# Patient Record
Sex: FEMALE | Race: WHITE | NOT HISPANIC OR LATINO | URBAN - METROPOLITAN AREA
[De-identification: names, ages, dates, MRNs, and addresses within clinical notes are randomized per-mention and may not be internally consistent; named-entity substitution may affect disease eponyms.]

---

## 2017-09-08 ENCOUNTER — APPOINTMENT (RX ONLY)
Dept: URBAN - METROPOLITAN AREA SURGERY 2 | Facility: SURGERY | Age: 47
Setting detail: DERMATOLOGY
End: 2017-09-08

## 2017-09-08 DIAGNOSIS — Z41.1 ENCOUNTER FOR COSMETIC SURGERY: ICD-10-CM

## 2017-09-08 DIAGNOSIS — L98.8 OTHER SPECIFIED DISORDERS OF THE SKIN AND SUBCUTANEOUS TISSUE: ICD-10-CM

## 2017-09-08 DIAGNOSIS — L90.5 SCAR CONDITIONS AND FIBROSIS OF SKIN: ICD-10-CM

## 2017-09-08 PROBLEM — F41.9 ANXIETY DISORDER, UNSPECIFIED: Status: ACTIVE | Noted: 2017-09-08

## 2017-09-08 PROCEDURE — ? PATIENT SPECIFIC COUNSELING

## 2017-09-08 PROCEDURE — ? COUNSELING - SCAR

## 2017-09-08 PROCEDURE — 99203 OFFICE O/P NEW LOW 30 MIN: CPT

## 2017-09-08 PROCEDURE — ?

## 2017-09-08 ASSESSMENT — LOCATION DETAILED DESCRIPTION DERM: LOCATION DETAILED: LEFT PERINEUM

## 2017-09-08 ASSESSMENT — LOCATION ZONE DERM: LOCATION ZONE: PERINEUM

## 2017-09-08 ASSESSMENT — LOCATION SIMPLE DESCRIPTION DERM: LOCATION SIMPLE: LEFT PERINEUM

## 2017-09-08 NOTE — PROCEDURE: PATIENT SPECIFIC COUNSELING
Other (Free Text): I explained to the patient and her  that what I see looks to be a sinus tract or fistula\\nThe scaring feels soft, no evidence of hypertrophic scaring.  \\nNeed to examine whether or not if it's scar tissue. \\nThe pin hole I see makes me suspicious that there is a tract that may be going into her anus. I would assume that the tissue on the inside is also damaged. \\nI explained that she could possibly have a fistula because of her amount of pain which I explained will never close if not treated. \\nI explained it could be a foreign body recation. However the fact that she had  a CT scan and nothing was found supporting that will rules out a foreign body reaction. \\nI advised pt to follow up with a colorectal surgeon to rule out a fistula. \\nI explained that if it is not a fistula I can go in take out the sinus track and any scar tissue and close her up with a local flap.
Detail Level: Zone
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
## 2017-09-15 ENCOUNTER — APPOINTMENT (RX ONLY)
Dept: URBAN - METROPOLITAN AREA SURGERY 2 | Facility: SURGERY | Age: 47
Setting detail: DERMATOLOGY
End: 2017-09-15

## 2017-09-15 DIAGNOSIS — L98.8 OTHER SPECIFIED DISORDERS OF THE SKIN AND SUBCUTANEOUS TISSUE: ICD-10-CM

## 2017-09-15 DIAGNOSIS — Z41.1 ENCOUNTER FOR COSMETIC SURGERY: ICD-10-CM

## 2017-09-15 DIAGNOSIS — L90.5 SCAR CONDITIONS AND FIBROSIS OF SKIN: ICD-10-CM

## 2017-09-15 PROCEDURE — ? PRE-OP WORKLIST

## 2017-09-15 PROCEDURE — ? PATIENT SPECIFIC COUNSELING

## 2017-09-15 PROCEDURE — ? COUNSELING - SCAR

## 2017-09-15 PROCEDURE — 99213 OFFICE O/P EST LOW 20 MIN: CPT

## 2017-09-15 PROCEDURE — ?

## 2017-09-15 ASSESSMENT — LOCATION ZONE DERM: LOCATION ZONE: PERINEUM

## 2017-09-15 ASSESSMENT — LOCATION SIMPLE DESCRIPTION DERM: LOCATION SIMPLE: LEFT PERINEUM

## 2017-09-15 ASSESSMENT — LOCATION DETAILED DESCRIPTION DERM: LOCATION DETAILED: LEFT PERINEUM

## 2017-09-15 NOTE — PROCEDURE: PRE-OP WORKLIST
Photos Taken?: photos to be taken on day of procedure
Surgeon: Emeterio Wallace MD
Detail Level: Simple

## 2017-09-15 NOTE — PROCEDURE: PATIENT SPECIFIC COUNSELING
Other (Free Text): Patient was informed that Dr. Wallace spoke with colorectal specialist and it was confirmed that she does not have a fistula. Patient was Informed  that she is now a candidate for surgery. Patient was post op instructions and signed consents and prescriptions where given.
Detail Level: Zone

## 2017-09-27 ENCOUNTER — RX ONLY (OUTPATIENT)
Age: 47
Setting detail: RX ONLY
End: 2017-09-27

## 2017-09-27 RX ORDER — OXYCODONE HYDROCHLORIDE AND ACETAMINOPHEN 5; 325 MG/1; MG/1
TABLET ORAL
Qty: 20 | Refills: 0 | Status: CANCELLED
Stop reason: ENTERED-IN-ERROR

## 2017-09-27 RX ORDER — ALPRAZOLAM 1 MG/1
TABLET ORAL
Qty: 8 | Refills: 0 | Status: ACTIVE

## 2017-09-27 RX ORDER — METHOCARBAMOL 500 MG/1
TABLET, FILM COATED ORAL
Qty: 40 | Refills: 0 | Status: ERX | COMMUNITY
Start: 2017-09-27

## 2017-09-27 RX ORDER — PROMETHAZINE HYDROCHLORIDE 25 MG/1
TABLET ORAL
Qty: 8 | Refills: 0 | Status: ERX | COMMUNITY
Start: 2017-09-27

## 2017-09-27 RX ORDER — DOXYCYCLINE HYCLATE 100 MG/1
CAPSULE, GELATIN COATED ORAL
Qty: 7 | Refills: 0 | Status: ERX | COMMUNITY
Start: 2017-09-27

## 2017-10-04 ENCOUNTER — APPOINTMENT (RX ONLY)
Dept: URBAN - METROPOLITAN AREA SURGERY 2 | Facility: SURGERY | Age: 47
Setting detail: DERMATOLOGY
End: 2017-10-04

## 2017-10-04 DIAGNOSIS — Z48.89 ENCOUNTER FOR OTHER SPECIFIED SURGICAL AFTERCARE: ICD-10-CM

## 2017-10-04 PROCEDURE — ? POST-OP CHECK

## 2017-10-04 PROCEDURE — 99024 POSTOP FOLLOW-UP VISIT: CPT

## 2017-10-04 PROCEDURE — ? PATIENT SPECIFIC COUNSELING

## 2017-10-04 PROCEDURE — ? COUNSELING - POST-OP CHECK

## 2017-10-04 ASSESSMENT — LOCATION ZONE DERM: LOCATION ZONE: PERINEUM

## 2017-10-04 ASSESSMENT — LOCATION DETAILED DESCRIPTION DERM: LOCATION DETAILED: LEFT PERINEUM

## 2017-10-04 ASSESSMENT — LOCATION SIMPLE DESCRIPTION DERM: LOCATION SIMPLE: LEFT PERINEUM

## 2017-10-04 NOTE — PROCEDURE: PATIENT SPECIFIC COUNSELING
Other (Free Text): Discussed with patient that the procedure went well and that after everything was removed it looked like very healthy skin left. \\n\\nDiscussed that patient needs to keep knee in front of her, do her best to not rotate left leg outward.\\nAdvised to advance with right foot, and bring left foot to the right foot in order to keep from extending left leg. \\nLeft hip should be up when lying down. \\n\\nAdvised patient to continue wearing the flex net underwear that were given by the hospital until drainage is gone.\\n\\nPatient advised that if next Tuesday she is doing well and it's clean and feeling okay and she sends a picture, then we may be able to skip the visit next week and return in 2 weeks.
Detail Level: Simple

## 2017-10-07 ENCOUNTER — RX ONLY (OUTPATIENT)
Age: 47
Setting detail: RX ONLY
End: 2017-10-07

## 2017-10-07 RX ORDER — DOXYCYCLINE HYCLATE 100 MG/1
CAPSULE, GELATIN COATED ORAL
Qty: 14 | Refills: 0 | Status: ERX

## 2017-10-16 ENCOUNTER — APPOINTMENT (RX ONLY)
Dept: URBAN - METROPOLITAN AREA CLINIC 12 | Facility: CLINIC | Age: 47
Setting detail: DERMATOLOGY
End: 2017-10-16

## 2017-10-16 ENCOUNTER — APPOINTMENT (RX ONLY)
Dept: URBAN - METROPOLITAN AREA SURGERY 2 | Facility: SURGERY | Age: 47
Setting detail: DERMATOLOGY
End: 2017-10-16

## 2017-10-16 DIAGNOSIS — Z48.89 ENCOUNTER FOR OTHER SPECIFIED SURGICAL AFTERCARE: ICD-10-CM

## 2017-10-16 PROCEDURE — ? POST-OP CHECK

## 2017-10-16 PROCEDURE — ? COUNSELING - POST-OP CHECK

## 2017-10-16 PROCEDURE — ? SET GLOBAL PERIOD

## 2017-10-16 PROCEDURE — ? PATIENT SPECIFIC COUNSELING

## 2017-10-16 PROCEDURE — 99024 POSTOP FOLLOW-UP VISIT: CPT

## 2017-10-16 ASSESSMENT — LOCATION ZONE DERM
LOCATION ZONE: PERINEUM
LOCATION ZONE: PERINEUM

## 2017-10-16 ASSESSMENT — LOCATION SIMPLE DESCRIPTION DERM
LOCATION SIMPLE: LEFT PERINEUM
LOCATION SIMPLE: LEFT PERINEUM

## 2017-10-16 ASSESSMENT — LOCATION DETAILED DESCRIPTION DERM
LOCATION DETAILED: LEFT PERINEUM
LOCATION DETAILED: LEFT PERINEUM

## 2017-10-16 NOTE — PROCEDURE: PATIENT SPECIFIC COUNSELING
Other (Free Text): Discussed with patient that she is healing well, and that the swelling she is experiencing is normal. There are no puss pockets present or any evidence of fluid building up. I advised patient that she should be in the clear if she continues improving with no draining or buildup within the next 2 months.\\n\\nI advised patient that she should continue applying topical ointment and washing with hibiclens. I also advised patient to take Ibuprofen 600 mg 3 x day for anti-inflammatory reasons.\\n\\nFollow up in 10 days
Detail Level: Zone

## 2017-10-16 NOTE — PROCEDURE: PATIENT SPECIFIC COUNSELING
Other (Free Text): Discussed with patient that she is healing well, and that the swelling she is experiencing is normal. There are no puss pockets present or any evidence of fluid building up. I advised patient that she should be in the clear if she continues improving with no draining or buildup within the next 2 months. \\n\\nI advised patient that she should continue applying topical ointment and washing with hibiclens. I also advised patient to take Ibuprofen 600 mg 3 x day for anti-inflammatory reasons. \\n\\nFollow up in 10 days
Detail Level: Simple

## 2017-10-16 NOTE — HPI: POST-OP CHECK, WOUND RECONSTRUCTION
History: This is a 47 year old female who is following up for post-op check on the left perineum. She was seen on October 4, 2017, at which time counseling post-op check was performed, she was treated with Post-Op Check, and patient specific counseling was performed.\\n\\nThe patient is now here for postop check and patient mentions soreness.\\n\\nToday the patient reports:\\nModifying factors: improved with treatment. Quality: constant.

## 2017-10-27 ENCOUNTER — APPOINTMENT (RX ONLY)
Dept: URBAN - METROPOLITAN AREA CLINIC 12 | Facility: CLINIC | Age: 47
Setting detail: DERMATOLOGY
End: 2017-10-27

## 2017-10-27 DIAGNOSIS — Z48.89 ENCOUNTER FOR OTHER SPECIFIED SURGICAL AFTERCARE: ICD-10-CM

## 2017-10-27 PROCEDURE — ? POST-OP CHECK

## 2017-10-27 PROCEDURE — ? PATIENT SPECIFIC COUNSELING

## 2017-10-27 PROCEDURE — 99024 POSTOP FOLLOW-UP VISIT: CPT

## 2017-10-27 PROCEDURE — ? SET GLOBAL PERIOD

## 2017-10-27 ASSESSMENT — LOCATION SIMPLE DESCRIPTION DERM: LOCATION SIMPLE: LEFT PERINEUM

## 2017-10-27 ASSESSMENT — LOCATION DETAILED DESCRIPTION DERM: LOCATION DETAILED: LEFT PERINEUM

## 2017-10-27 ASSESSMENT — LOCATION ZONE DERM: LOCATION ZONE: PERINEUM

## 2017-10-27 NOTE — PROCEDURE: PATIENT SPECIFIC COUNSELING
Detail Level: Zone
Other (Free Text): Advised that the wound is healing very well and swelling is down a lot. I am very pleased with the progress I can see. I advised patient she can gradually can start getting back into routine, even taking baths. The gym is not advised yet and she is not to over due walking.\\n\\nRecommended patient get a donut for her bottom to alleviate some discomfort when she is seated. Patient would like to start physical therapy with a group she has used before. Advised it is goi for her to get ahead of the game but recommend she waits 3-4 weeks before she starts treatment. She is ok to use a compounded gabapentin/lidocaine cream she was previously given by another practitioner to see if it will alleviate some of the pain.

## 2017-10-27 NOTE — PROCEDURE: POST-OP CHECK
Detail Level: Simple
Add Postop Global No-Charge Code (30732)?: yes
Wound Evaluated By: Dr. Emeterio Wallace

## 2017-11-29 ENCOUNTER — APPOINTMENT (RX ONLY)
Dept: URBAN - METROPOLITAN AREA CLINIC 12 | Facility: CLINIC | Age: 47
Setting detail: DERMATOLOGY
End: 2017-11-29

## 2017-11-29 DIAGNOSIS — Z48.89 ENCOUNTER FOR OTHER SPECIFIED SURGICAL AFTERCARE: ICD-10-CM

## 2017-11-29 PROCEDURE — 99024 POSTOP FOLLOW-UP VISIT: CPT

## 2017-11-29 PROCEDURE — ? SET GLOBAL PERIOD

## 2017-11-29 PROCEDURE — ? PATIENT SPECIFIC COUNSELING

## 2017-11-29 PROCEDURE — ? COUNSELING - POST-OP CHECK

## 2017-11-29 PROCEDURE — ? POST-OP CHECK

## 2017-11-29 ASSESSMENT — LOCATION SIMPLE DESCRIPTION DERM: LOCATION SIMPLE: LEFT PERINEUM

## 2017-11-29 ASSESSMENT — LOCATION ZONE DERM: LOCATION ZONE: PERINEUM

## 2017-11-29 ASSESSMENT — LOCATION DETAILED DESCRIPTION DERM: LOCATION DETAILED: LEFT PERINEUM

## 2017-11-29 NOTE — PROCEDURE: POST-OP CHECK
Detail Level: Simple
Wound Evaluated By: Dr. Emeterio Wallace
Add Postop Global No-Charge Code (36313)?: yes

## 2019-08-27 ENCOUNTER — APPOINTMENT (RX ONLY)
Dept: URBAN - METROPOLITAN AREA OTHER 9 | Facility: OTHER | Age: 49
Setting detail: DERMATOLOGY
End: 2019-08-27